# Patient Record
Sex: FEMALE | Race: WHITE | NOT HISPANIC OR LATINO | Employment: FULL TIME | URBAN - NONMETROPOLITAN AREA
[De-identification: names, ages, dates, MRNs, and addresses within clinical notes are randomized per-mention and may not be internally consistent; named-entity substitution may affect disease eponyms.]

---

## 2018-08-23 ENCOUNTER — LAB (OUTPATIENT)
Dept: LAB | Facility: HOSPITAL | Age: 53
End: 2018-08-23

## 2018-08-23 ENCOUNTER — TRANSCRIBE ORDERS (OUTPATIENT)
Dept: LAB | Facility: HOSPITAL | Age: 53
End: 2018-08-23

## 2018-08-23 DIAGNOSIS — M81.0 OSTEOPOROSIS, UNSPECIFIED OSTEOPOROSIS TYPE, UNSPECIFIED PATHOLOGICAL FRACTURE PRESENCE: Primary | ICD-10-CM

## 2018-08-23 DIAGNOSIS — M81.0 OSTEOPOROSIS, UNSPECIFIED OSTEOPOROSIS TYPE, UNSPECIFIED PATHOLOGICAL FRACTURE PRESENCE: ICD-10-CM

## 2018-08-23 LAB — 25(OH)D3 SERPL-MCNC: 66.8 NG/ML (ref 30–100)

## 2018-08-23 PROCEDURE — 83970 ASSAY OF PARATHORMONE: CPT

## 2018-08-23 PROCEDURE — 82306 VITAMIN D 25 HYDROXY: CPT

## 2018-08-23 PROCEDURE — 36415 COLL VENOUS BLD VENIPUNCTURE: CPT

## 2018-08-24 LAB — PTH-INTACT SERPL-MCNC: 80.3 PG/ML (ref 10–65)

## 2020-12-21 ENCOUNTER — OFFICE VISIT (OUTPATIENT)
Dept: ORTHOPEDIC SURGERY | Facility: CLINIC | Age: 55
End: 2020-12-21

## 2020-12-21 VITALS — WEIGHT: 148.8 LBS | BODY MASS INDEX: 25.4 KG/M2 | HEIGHT: 64 IN

## 2020-12-21 DIAGNOSIS — M79.671 RIGHT FOOT PAIN: Primary | ICD-10-CM

## 2020-12-21 DIAGNOSIS — M77.51 RIGHT ANKLE TENDONITIS: ICD-10-CM

## 2020-12-21 PROCEDURE — 99203 OFFICE O/P NEW LOW 30 MIN: CPT | Performed by: NURSE PRACTITIONER

## 2020-12-21 RX ORDER — METHYLPREDNISOLONE 4 MG/1
TABLET ORAL
Qty: 21 TABLET | Refills: 0 | Status: SHIPPED | OUTPATIENT
Start: 2020-12-21

## 2020-12-21 RX ORDER — ASCORBIC ACID 500 MG
500 TABLET ORAL DAILY
COMMUNITY

## 2020-12-21 RX ORDER — IBUPROFEN 800 MG/1
800 TABLET ORAL EVERY 6 HOURS PRN
COMMUNITY

## 2020-12-21 RX ORDER — ERGOCALCIFEROL (VITAMIN D2) 10 MCG
400 TABLET ORAL DAILY
COMMUNITY

## 2020-12-21 RX ORDER — VITAMIN E 268 MG
400 CAPSULE ORAL DAILY
COMMUNITY

## 2020-12-21 NOTE — PROGRESS NOTES
Vicenta Castelan is a 55 y.o. female   Primary provider:  Margoth Fields MD       Chief Complaint   Patient presents with   • Right Foot - Pain, Initial Evaluation       HISTORY OF PRESENT ILLNESS: Patient is a 55-year-old female who presents today with complaints of right foot pain and ankle pain that started approximately 3 weeks ago.  She reports intermittent swelling.  She reports that pain is worse by the end of the day.  She denies burning, tingling, numbness.  She has tried Mobic which made her hands tingle, so she is now using ibuprofen instead.  Ibuprofen helps some.  She reports pain is a 0 out of 10 when still but this increases to 4 5 out of 10 with movement.  She reports that most of her pain is on her lateral ankle but sometimes she will have tenderness on both sides.  She reports that going up and down stairs aggravates the pain.  She denies specific injury.  She reports 3 weeks ago she went on a hike and stepped in a hole, but she says this is so insignificant she cannot remember which foot she actually twisted.  Patient does state that since hiking the past 3 weeks that she has been painting her entire house and going up and down ladders many many times.  Patient also reports that the how she moved into his 3 levels.  She reports that she has increased the amount of time she goes up and down stairs as well.    Pain  This is a new problem. The current episode started 1 to 4 weeks ago. The problem occurs intermittently. Associated symptoms comments: Aching, swelling . The symptoms are aggravated by standing and walking. She has tried acetaminophen, heat, ice and rest for the symptoms.        CONCURRENT MEDICAL HISTORY:    Past Medical History:   Diagnosis Date   • Osteoporosis        Allergies   Allergen Reactions   • Penicillins Rash   • Tetracyclines & Related Rash         Current Outpatient Medications:   •  ascorbic acid (VITAMIN C) 500 MG tablet, Take 500 mg by mouth Daily., Disp: , Rfl:   •   "Calcium 250 MG capsule, Take  by mouth., Disp: , Rfl:   •  ibuprofen (ADVIL,MOTRIN) 800 MG tablet, Take 800 mg by mouth Every 6 (Six) Hours As Needed for Mild Pain ., Disp: , Rfl:   •  Vitamin D, Cholecalciferol, (CHOLECALCIFEROL) 10 MCG (400 UNIT) tablet, Take 400 Units by mouth Daily., Disp: , Rfl:   •  vitamin E 400 UNIT capsule, Take 400 Units by mouth Daily., Disp: , Rfl:   •  methylPREDNISolone (MEDROL) 4 MG dose pack, Use as directed by package instructions, Disp: 21 tablet, Rfl: 0    Past Surgical History:   Procedure Laterality Date   • HYSTERECTOMY         History reviewed. No pertinent family history.    Social History     Socioeconomic History   • Marital status:      Spouse name: Not on file   • Number of children: Not on file   • Years of education: Not on file   • Highest education level: Not on file   Tobacco Use   • Smoking status: Never Smoker   • Smokeless tobacco: Never Used   Substance and Sexual Activity   • Alcohol use: Never     Frequency: Never   • Drug use: Defer   • Sexual activity: Defer        Review of Systems   Musculoskeletal:        Right ankle pain   All other systems reviewed and are negative.      PHYSICAL EXAMINATION:       Ht 162.6 cm (64\")   Wt 67.5 kg (148 lb 12.8 oz)   BMI 25.54 kg/m²     Physical Exam  Vitals signs and nursing note reviewed.   Constitutional:       General: She is not in acute distress.     Appearance: She is well-developed. She is not toxic-appearing.   HENT:      Head: Normocephalic.   Pulmonary:      Effort: Pulmonary effort is normal. No respiratory distress.   Skin:     General: Skin is warm and dry.   Neurological:      Mental Status: She is alert and oriented to person, place, and time.   Psychiatric:         Behavior: Behavior normal.         Thought Content: Thought content normal.         Judgment: Judgment normal.         GAIT:     []  Normal  []  Antalgic    Assistive device: [x]  None  []  Walker     []  Crutches  []  Cane     []  " Wheelchair  []  Stretcher    Right Ankle Exam     Tenderness   Right ankle tenderness location: Generalized, mostly lateral.  Swelling: mild    Range of Motion   Dorsiflexion: 10   Plantar flexion: 20     Other   Erythema: absent  Sensation: normal  Pulse: present     Comments:  Toes are warm, pink, with good capillary refill and normal sensation.  Neurovascular intact.        Left Ankle Exam     Tenderness   The patient is experiencing no tenderness.   Swelling: none    Range of Motion   Dorsiflexion: 20   Plantar flexion: 35     Other   Erythema: absent  Sensation: normal  Pulse: present                  No results found.        ASSESSMENT:    Diagnoses and all orders for this visit:    Right foot pain    Right ankle tendonitis    Other orders  -     ibuprofen (ADVIL,MOTRIN) 800 MG tablet; Take 800 mg by mouth Every 6 (Six) Hours As Needed for Mild Pain .  -     vitamin E 400 UNIT capsule; Take 400 Units by mouth Daily.  -     ascorbic acid (VITAMIN C) 500 MG tablet; Take 500 mg by mouth Daily.  -     Vitamin D, Cholecalciferol, (CHOLECALCIFEROL) 10 MCG (400 UNIT) tablet; Take 400 Units by mouth Daily.  -     Calcium 250 MG capsule; Take  by mouth.  -     methylPREDNISolone (MEDROL) 4 MG dose pack; Use as directed by package instructions          PLAN    [facility] from [facility] reviewed, no acute bony abnormality.  Given patient does not have specific injury but has had recent increase in her activity such as climbing stairs and ladders suspect tendinitis.  Patient instructed to proceed with rice therapy, Ace bandage, patient instructed to elevate and ice ankle at least 3-4 times per day for 15 to 20 minutes each time.  Patient instructed on activity modification.  Patient to continue ibuprofen.  Patient given air stirrup for more support.  Patient also prescribed Medrol Dosepak.  Patient to return in 2 weeks for recheck or sooner if needed.    Body mass index is 25.54 kg/m².    Return in about 2 weeks (around  1/4/2021).    Faby Coon APRN

## 2021-01-19 ENCOUNTER — OFFICE VISIT (OUTPATIENT)
Dept: ORTHOPEDIC SURGERY | Facility: CLINIC | Age: 56
End: 2021-01-19

## 2021-01-19 VITALS — WEIGHT: 156 LBS | BODY MASS INDEX: 26.63 KG/M2 | HEIGHT: 64 IN

## 2021-01-19 DIAGNOSIS — M72.2 PLANTAR FASCIITIS, BILATERAL: Primary | ICD-10-CM

## 2021-01-19 DIAGNOSIS — M76.61 ACHILLES TENDINITIS OF BOTH LOWER EXTREMITIES: ICD-10-CM

## 2021-01-19 DIAGNOSIS — M76.62 ACHILLES TENDINITIS OF BOTH LOWER EXTREMITIES: ICD-10-CM

## 2021-01-19 PROCEDURE — 99214 OFFICE O/P EST MOD 30 MIN: CPT | Performed by: NURSE PRACTITIONER

## 2021-01-19 RX ORDER — ESTRADIOL 1 MG/1
TABLET ORAL
COMMUNITY
Start: 2020-12-14

## 2021-01-19 RX ORDER — METHYLPREDNISOLONE 4 MG/1
TABLET ORAL
Qty: 21 TABLET | Refills: 0 | Status: SHIPPED | OUTPATIENT
Start: 2021-01-19

## 2021-01-19 NOTE — PROGRESS NOTES
"Vicenta Castelan is a 55 y.o. female re     Chief Complaint   Patient presents with   • Right Foot - Follow-up, Pain       HISTORY OF PRESENT ILLNESS: Patient is a 55-year-old female who presents today to follow-up on right foot and ankle pain that started approximately 7 weeks ago.  At last appointment patient was experiencing pain in her lateral ankle.  Patient reports that this pain has resolved and she had been feeling much better, however now she is having pain in bilateral Achilles tendons.  Patient reports that she noticed a big improvement in her pain secondary to Medrol Dosepak and Aircast.      Patient reports pain in bilateral Achilles tendons.  Patient denies injury.  Patient does report just prior to pain starting she visited her son and while on vacation sharply increased amount of steps she took per day.  Patient reports that there were days where she would walk 15,000 steps which is above her normal.  She reports that pain in her bilateral Achilles is most concerning, however she is also having some pain on the bottom of her heel.  Patient reports that this is only with the first few steps she takes in the morning and then this gets better.  She reports taking 800 mg of ibuprofen which does help with the pain some.  She denies recent antibiotic use.  She denies fever, nausea, vomiting.  No redness or tenderness in either calf.  She reports the left Achilles a 5 out of 10 and right Achilles a 2 out of 10.       CONCURRENT MEDICAL HISTORY:    The following portions of the patient's history were reviewed and updated as appropriate: allergies, current medications, past family history, past medical history, past social history, past surgical history and problem list.     ROS  No fevers or chills.  No chest pain or shortness of air.  No GI or  disturbances. Bilateral achilles tendon pain/bilateral heel pain.     PHYSICAL EXAMINATION:       Ht 162.6 cm (64\")   Wt 70.8 kg (156 lb)   BMI 26.78 kg/m² "     Physical Exam  Vitals signs and nursing note reviewed.   Constitutional:       General: She is not in acute distress.     Appearance: She is well-developed. She is not toxic-appearing.   HENT:      Head: Normocephalic.   Pulmonary:      Effort: Pulmonary effort is normal. No respiratory distress.   Skin:     General: Skin is warm and dry.   Neurological:      Mental Status: She is alert and oriented to person, place, and time.   Psychiatric:         Behavior: Behavior normal.         Thought Content: Thought content normal.         Judgment: Judgment normal.         GAIT:     [x]  Normal  []  Antalgic    Assistive device: [x]  None  []  Walker     []  Crutches  []  Cane     []  Wheelchair  []  Stretcher    Right Ankle Exam     Tenderness   The patient is experiencing no tenderness.  Swelling: none    Range of Motion   The patient has normal right ankle ROM.    Other   Erythema: absent  Sensation: normal  Pulse: present     Comments:  Increased pain in achilles with dorsiflexion  Negative Windlass test  Tenderness with palpation of plantar fascia 2 cm distally from calcaneal tuberosity  Calf is soft and nontender, negative Homans' sign  Patient has tenderness along Achilles tendon  No deformity of Achilles tendon      Left Ankle Exam     Tenderness   The patient is experiencing no tenderness.   Swelling: none    Range of Motion   The patient has normal left ankle ROM.     Other   Erythema: absent  Sensation: normal  Pulse: present    Comments:  Increased pain in achilles with dorsiflexion  Negative Windlass test  Tenderness with palpation of plantar fascia 2 cm distally from calcaneal tuberosity  Calf is soft and nontender, negative Homans' sign  Patient has tenderness along Achilles tendon  No deformity of Achilles tendon              No results found.          ASSESSMENT:    Diagnoses and all orders for this visit:    Plantar fasciitis, bilateral    Achilles tendinitis of both lower extremities    Other  orders  -     estradiol (ESTRACE) 1 MG tablet  -     methylPREDNISolone (MEDROL) 4 MG dose pack; Use as directed by package instructions          PLAN    Patient is having mixed symptoms of both plantar fasciitis and Achilles tendinitis in both lower extremities.  Most likely secondary to increased steps per day while visiting son.  Patient instructed to wear supportive shoe with heel lift inserts.  Patient also instructed to perform rice therapy and to video modification.  Patient reports great help from Medrol Dosepak for ankle tendinitis seen at last appointment.  Medrol Dosepak reordered.  Patient to return in 2 weeks for recheck.  If not better by next appointment, plan for x-rays of left ankle as patient has he had x-rays on the right.      Return in about 2 weeks (around 2/2/2021).    Faby Coon, APRN

## 2021-01-29 DIAGNOSIS — M76.61 ACHILLES TENDINITIS OF BOTH LOWER EXTREMITIES: Primary | ICD-10-CM

## 2021-01-29 DIAGNOSIS — M76.62 ACHILLES TENDINITIS OF BOTH LOWER EXTREMITIES: Primary | ICD-10-CM

## 2021-12-29 RX ORDER — PANTOPRAZOLE SODIUM 40 MG/1
40 TABLET, DELAYED RELEASE ORAL DAILY
Qty: 30 TABLET | Refills: 5 | Status: SHIPPED | OUTPATIENT
Start: 2021-12-29

## 2022-03-15 RX ORDER — BIMATOPROST 3 UG/ML
SOLUTION TOPICAL NIGHTLY
Qty: 5 ML | Refills: 12 | Status: SHIPPED | OUTPATIENT
Start: 2022-03-15

## 2022-07-04 RX ORDER — PROMETHAZINE HYDROCHLORIDE 25 MG/1
25 TABLET ORAL EVERY 6 HOURS PRN
Qty: 30 TABLET | Refills: 1 | Status: SHIPPED | OUTPATIENT
Start: 2022-07-04

## 2022-08-13 RX ORDER — VALACYCLOVIR HYDROCHLORIDE 1 G/1
2000 TABLET, FILM COATED ORAL 2 TIMES DAILY
Qty: 4 TABLET | Refills: 0 | Status: SHIPPED | OUTPATIENT
Start: 2022-08-13 | End: 2022-08-14

## 2022-12-10 RX ORDER — AZITHROMYCIN 250 MG/1
TABLET, FILM COATED ORAL
Qty: 6 TABLET | Refills: 0 | Status: SHIPPED | OUTPATIENT
Start: 2022-12-10 | End: 2023-01-13 | Stop reason: SDUPTHER

## 2023-01-13 RX ORDER — METHYLPREDNISOLONE 4 MG/1
TABLET ORAL
Qty: 21 TABLET | Refills: 0 | Status: SHIPPED | OUTPATIENT
Start: 2023-01-13

## 2023-01-13 RX ORDER — SCOLOPAMINE TRANSDERMAL SYSTEM 1 MG/1
1 PATCH, EXTENDED RELEASE TRANSDERMAL
Qty: 10 PATCH | Refills: 1 | Status: SHIPPED | OUTPATIENT
Start: 2023-01-13

## 2023-01-13 RX ORDER — AZITHROMYCIN 250 MG/1
TABLET, FILM COATED ORAL
Qty: 6 TABLET | Refills: 0 | Status: SHIPPED | OUTPATIENT
Start: 2023-01-13